# Patient Record
Sex: FEMALE | Race: WHITE
[De-identification: names, ages, dates, MRNs, and addresses within clinical notes are randomized per-mention and may not be internally consistent; named-entity substitution may affect disease eponyms.]

---

## 2017-02-05 ENCOUNTER — HOSPITAL ENCOUNTER (EMERGENCY)
Dept: HOSPITAL 72 - EMR | Age: 62
Discharge: HOME | End: 2017-02-05
Payer: COMMERCIAL

## 2017-02-05 VITALS — DIASTOLIC BLOOD PRESSURE: 63 MMHG | SYSTOLIC BLOOD PRESSURE: 112 MMHG

## 2017-02-05 VITALS — BODY MASS INDEX: 24.84 KG/M2 | WEIGHT: 135 LBS | HEIGHT: 62 IN

## 2017-02-05 VITALS — SYSTOLIC BLOOD PRESSURE: 112 MMHG | DIASTOLIC BLOOD PRESSURE: 63 MMHG

## 2017-02-05 DIAGNOSIS — J11.1: Primary | ICD-10-CM

## 2017-02-05 DIAGNOSIS — E11.9: ICD-10-CM

## 2017-02-05 PROCEDURE — 99284 EMERGENCY DEPT VISIT MOD MDM: CPT

## 2017-02-05 NOTE — EMERGENCY ROOM REPORT
History of Present Illness


General


Chief Complaint:  Upper Respiratory Illness


Source:  Patient





Present Illness


HPI


61-year-old female presents to ED complaining of bodyaches, chills, cough.  

States symptoms started yesterday.  Noting low grade fever.  States cough is 

dry.  Notes generalized bodyaches, 5 out of 10, dull.  No aggravating relieving 

factors.  Denies sick contacts or recent travel.  Did not receive flu 

vaccination this year. denies any other associated symptom


Allergies:  


Coded Allergies:  


     No Known Allergies (Unverified , 6/25/13)





Patient History


Past Medical History:  DM


Past Surgical History:  none


Pertinent Family History:  none


Social History:  Denies: alcohol use, drug use, smoking


Pregnant Now:  No


Immunizations:  UTD


Reviewed Nursing Documentation:  PMH: Agreed, PSxH: Agreed





Nursing Documentation-PMH


Hx Diabetes:  Yes





Review of Systems


All Other Systems:  negative except mentioned in HPI





Physical Exam





Vital Signs








  Date Time  Temp Pulse Resp B/P Pulse Ox O2 Delivery O2 Flow Rate FiO2


 


2/5/17 07:32 100.2 98 18 112/63 99 Room Air  








Sp02 EP Interpretation:  reviewed, normal


General Appearance:  no apparent distress, alert, GCS 15, non-toxic


Head:  normocephalic, atraumatic


Eyes:  bilateral eye PERRL, bilateral eye normal inspection


ENT:  hearing grossly normal, normal pharynx, no angioedema, normal voice


Neck:  full range of motion, supple/symm/no masses


Respiratory:  chest non-tender, lungs clear, normal breath sounds, speaking 

full sentences


Cardiovascular #1:  regular rate, rhythm, no edema


Cardiovascular #2:  2+ carotid (R), 2+ carotid (L), 2+ radial (R), 2+ radial (L)

, 2+ dorsalis pedis (R), 2+ dorsalis pedis (L)


Gastrointestinal:  normal bowel sounds, non tender, soft, non-distended, no 

guarding, no rebound


Rectal:  deferred


Genitourinary:  normal inspection, no CVA tenderness


Musculoskeletal:  back normal, gait/station normal, normal range of motion, non-

tender


Neurologic:  alert, oriented x3, responsive, motor strength/tone normal, 

sensory intact, speech normal


Psychiatric:  judgement/insight normal, memory normal, mood/affect normal, no 

suicidal/homicidal ideation


Reflexes:  3+ bicep (R), 3+ bicep (L), 3+ tricep (R), 3+ tricep (L), 3+ knee (R)

, 3+ knee (L)


Skin:  normal color, no rash, warm/dry, well hydrated


Lymphatic:  no adenopathy





Medical Decision Making


Diagnostic Impression:  


 Primary Impression:  


 Flu-like symptoms


ER Course


Hospital Course 


61-year-old F presents to ED complaining of fever + bodyaches + cough 





Differential diagnoses include: URI, pharyngitis, otitis media, influenza 





Clinical course


Patient placed on stretcher.  After initial history physical exam reveals an 

elderly female in no acute distress.  Bilateral TM unremarkable, no pharyngeal 

erythema.  Lungs clear.  No CVA tenderness.





Given motion in ED.  Clinical findings consistent with influenza.  Given that I 

will treat her with Tamiflu





Diagnosis - influenza-like symptoms 





Stable and discharged home with prescriptions for tamiflu.  drink plenty of 

fluids.  Instructed to followup with PMD.  Return to ED if symptoms recur or 

worsen





Last Vital Signs








  Date Time  Temp Pulse Resp B/P Pulse Ox O2 Delivery O2 Flow Rate FiO2


 


2/5/17 08:20 98.8 96 18 112/63 99 Room Air  








Status:  improved


Disposition:  HOME, SELF-CARE


Condition:  Stable


Scripts


Ibuprofen* (MOTRIN*) 600 Mg Tablet


600 MG ORAL Q8H Y for For Pain, #30 TAB 0 Refills


   Prov: JH TORRES M.D.         2/5/17 


Oseltamivir Phosphate (Tamiflu) 75 Mg Capsule


75 MG ORAL TWICE A DAY for 5 Days, CAP


   Prov: JH TORRES M.D.         2/5/17


Patient Instructions:  Influenza, Adult, Easy-to-Read











JH TORRES M.D. Feb 5, 2017 10:33

## 2017-06-04 ENCOUNTER — HOSPITAL ENCOUNTER (EMERGENCY)
Dept: HOSPITAL 72 - EMR | Age: 62
Discharge: HOME | End: 2017-06-04
Payer: COMMERCIAL

## 2017-06-04 VITALS — HEIGHT: 62 IN | BODY MASS INDEX: 24.84 KG/M2 | WEIGHT: 135 LBS

## 2017-06-04 VITALS — SYSTOLIC BLOOD PRESSURE: 110 MMHG | DIASTOLIC BLOOD PRESSURE: 57 MMHG

## 2017-06-04 VITALS — DIASTOLIC BLOOD PRESSURE: 57 MMHG | SYSTOLIC BLOOD PRESSURE: 110 MMHG

## 2017-06-04 DIAGNOSIS — E11.9: ICD-10-CM

## 2017-06-04 DIAGNOSIS — L03.011: Primary | ICD-10-CM

## 2017-06-04 PROCEDURE — 99284 EMERGENCY DEPT VISIT MOD MDM: CPT

## 2017-06-04 NOTE — EMERGENCY ROOM REPORT
History of Present Illness


General


Chief Complaint:  Skin Rash/Abscess


Source:  Patient





Present Illness


HPI


61 y/o female c/o nail bed infection in right index finger. States she had pain 

for 1 week and started having discharge and swelling today. States pain is 

moderate to severe and is worse with palpation and has no relieving factors. No 

other physical complaints. Denies trauma.  Patient denies any numbness, tingling

, pressure, paralysis, cyanosis, bruising, loss of sensation, or loss of range 

of motion.


Allergies:  


Coded Allergies:  


     No Known Allergies (Unverified , 6/25/13)





Patient History


Past Medical History:  see triage record


Past Surgical History:  none


Pertinent Family History:  none


Pregnant Now:  No


Reviewed Nursing Documentation:  PMH: Agreed, PSxH: Agreed





Nursing Documentation-PMH


Past Medical History:  No History, Except For


Hx Diabetes:  Yes





Review of Systems


All Other Systems:  negative except mentioned in HPI





Physical Exam





Vital Signs








  Date Time  Temp Pulse Resp B/P Pulse Ox O2 Delivery O2 Flow Rate FiO2


 


6/4/17 18:47 99.0 92 16 110/57 97 Room Air  








Sp02 EP Interpretation:  reviewed, normal


General Appearance:  no apparent distress, alert, GCS 15, non-toxic


Head:  normocephalic, atraumatic


Eyes:  bilateral eye normal inspection


ENT:  normal ENT inspection


Neck:  normal inspection


Respiratory:  normal breath sounds, speaking full sentences


Cardiovascular #1:  normal peripheral pulses, normal capillary refill


Musculoskeletal:  back normal, gait/station normal, normal range of motion, non-

tender, calf tenderness


Neurologic:  alert, oriented x3, responsive, motor strength/tone normal, 

sensory intact, speech normal


Skin:  normal color, no rash, warm/dry, well hydrated, other - nailbed of right 

index finger is swollen, indurated, warm and with discharge w/o signs of 

abscess formation


Lymphatic:  no adenopathy





Medical Decision Making


PA Attestation


Dr. Adams is my supervising physician with whom patient management has been 

discussed with.


Diagnostic Impression:  


 Primary Impression:  


 Paronychia


 Qualified Codes:  L03.011 - Cellulitis of right finger


ER Course


Pt. presents to the ED c/o right finger pain


Ddx considered but are not limited to ingrowing nail, trauma, fracture, 

osteomyelitis, paronychia


Vital signs: are WNL, pt. is afebrile 


H&PE are most consistent with paronychia


ORDERS: none required at this time, the diagnosis is clinical 


ED INTERVENTIONS:  Norco 5


DISCHARGE: At this time pt. is stable for d/c to home. Will provide printed 

patient care instructions, and any necessary prescriptions. Care plan and 

follow up instructions have been discussed with the patient prior to discharge.





Last Vital Signs








  Date Time  Temp Pulse Resp B/P Pulse Ox O2 Delivery O2 Flow Rate FiO2


 


6/4/17 19:00 99.0 69 16 110/57 97 Room Air  








Disposition:  HOME, SELF-CARE


Condition:  Stable


Scripts


Trimethoprim/Sulfamethoxazole 160/800* (BACTRIM DS TABLET*) 1 Each Tablet


1 TAB ORAL TWICE A DAY, #14 TAB


   Prov: REY YOON P.A.         6/4/17 


Ibuprofen* (MOTRIN*) 600 Mg Tablet


600 MG ORAL Q6H Y for For Pain, #30 TAB


   Prov: REY YOON P.A.         6/4/17


Patient Instructions:  Paronychia





Additional Instructions:  


Take medication as directed. Patient instructed to take ibuprofen and tylenol 

as needed for pain. Patient to return for wound check in 35 days either here, 

urgent care or with PCP. Advised patient to keep site of infection elevated 

above the level of their heart 3 or 4 times a day, for 30 minutes each time to 

help reduce swelling. Patient is to keep the infected area clean and dry. They 

can take a shower or bath, but be sure to pat the area dry with a towel 

afterward. Patient instructed to not put any antibiotic ointments or creams on 

the area. Patient should come back sooner if their symptoms do not get better 

within 3 days of starting treatment or if the red area gets bigger, more swollen

, or more painful.











REY YOON Jun 4, 2017 19:23

## 2017-09-03 ENCOUNTER — HOSPITAL ENCOUNTER (EMERGENCY)
Dept: HOSPITAL 72 - EMR | Age: 62
Discharge: HOME | End: 2017-09-03
Payer: COMMERCIAL

## 2017-09-03 VITALS — DIASTOLIC BLOOD PRESSURE: 56 MMHG | SYSTOLIC BLOOD PRESSURE: 117 MMHG

## 2017-09-03 VITALS — HEIGHT: 62 IN | WEIGHT: 130 LBS | BODY MASS INDEX: 23.92 KG/M2

## 2017-09-03 VITALS — SYSTOLIC BLOOD PRESSURE: 117 MMHG | DIASTOLIC BLOOD PRESSURE: 56 MMHG

## 2017-09-03 DIAGNOSIS — E11.9: ICD-10-CM

## 2017-09-03 DIAGNOSIS — L03.012: Primary | ICD-10-CM

## 2017-09-03 PROCEDURE — 99284 EMERGENCY DEPT VISIT MOD MDM: CPT

## 2017-09-03 NOTE — EMERGENCY ROOM REPORT
History of Present Illness


General


Chief Complaint:  General Complaint


Source:  Patient





Present Illness


HPI


62-year-old female presents ED complaining of pain and swelling to the left 

index finger x2 days.  Notes some redness and swelling around the nail bed.  

Pain is a 5/10, throbbing, nonradiating.  Denies any fevers or chills.  Denies 

discharge.  No other aggravating or relieving factors.  Denies any other 

associated symptoms


Allergies:  


Coded Allergies:  


     No Known Allergies (Unverified , 6/25/13)





Patient History


Past Medical History:  DM


Past Surgical History:  none


Pertinent Family History:  none


Social History:  Denies: smoking, alcohol use, drug use


Pregnant Now:  No


Immunizations:  UTD


Reviewed Nursing Documentation:  PMH: Agreed, PSxH: Agreed





Nursing Documentation-PMH


Hx Diabetes:  Yes





Review of Systems


All Other Systems:  negative except mentioned in HPI





Physical Exam





Vital Signs








  Date Time  Temp Pulse Resp B/P (MAP) Pulse Ox O2 Delivery O2 Flow Rate FiO2


 


9/3/17 08:43 98.2 92 16 117/56 98 Room Air  








Sp02 EP Interpretation:  reviewed, normal


General Appearance:  no apparent distress, alert, GCS 15, non-toxic


Head:  normocephalic


Eyes:  bilateral eye normal inspection, bilateral eye PERRL


ENT:  normal ENT inspection


Neck:  normal inspection


Respiratory:  normal inspection


Cardiovascular #1:  normal inspection


Gastrointestinal:  normal inspection


Rectal:  deferred


Genitourinary:  no CVA tenderness


Musculoskeletal:  swelling - erythema/swelling around nailbed on L index 

finger. no fluctuance or discharge


Neurologic:  alert, oriented x3, responsive, motor strength/tone normal, 

sensory intact, speech normal


Psychiatric:  normal inspection


Skin:  normal inspection


Lymphatic:  normal inspection





Medical Decision Making


Diagnostic Impression:  


 Primary Impression:  


 Paronychia


ER Course


Hospital Course 


62-year-old F presents to ED s/p swelling L index finger. no trauma





Clinical course


Patient placed on stretcher.  After initial history is full exam reveals a male 

in no acute distress.  There is some swelling and fluctuance around the L index

  finger nailbed.  Consistent with paronychia





I explained to the patient that this requires drainage and antibiotics.  

Patient refuses drainage.  Will agree to warm compresses and antibiotics





Explained to the patient that this may not effectively treat her infection and 

she may need to return for drainage.  Patient understands





Diagnosis - paronychia 





Stable and discharged to home with prescription for Clindamycin.  Warm 

compresses.  Followup with PMD.  Return to ED if any signs of infection develop





Last Vital Signs








  Date Time  Temp Pulse Resp B/P (MAP) Pulse Ox O2 Delivery O2 Flow Rate FiO2


 


9/3/17 09:22 98.2  16 117/56 98 Room Air  


 


9/3/17 08:43  92      








Status:  improved


Disposition:  HOME, SELF-CARE


Condition:  Stable


Scripts


Clindamycin Hcl (CLINDAMYCIN HCL) 300 Mg Capsule


300 MG ORAL THREE TIMES A DAY, #21 CAP


   Prov: JH TORRES M.D.         9/3/17 


Acetaminophen* (TYLENOL EXTRA STRENGTH*) 500 Mg Tablet


500 MG ORAL Q8H Y for Prn Headache/Temp > 101, #30 TAB 0 Refills


   Prov: JH TORRES M.D.         9/3/17


Referrals:  


[facility] (PCP)


Patient Instructions:  Paronychia, Easy-to-Read





Additional Instructions:  


take abx. warm compresses. if symptoms do not improve return for incision and 

drainage











JH TORRES M.D. Sep 3, 2017 11:52

## 2017-12-28 ENCOUNTER — HOSPITAL ENCOUNTER (EMERGENCY)
Dept: HOSPITAL 72 - EMR | Age: 62
Discharge: HOME | End: 2017-12-28
Payer: COMMERCIAL

## 2017-12-28 VITALS — BODY MASS INDEX: 23 KG/M2 | HEIGHT: 62 IN | WEIGHT: 125 LBS

## 2017-12-28 VITALS — DIASTOLIC BLOOD PRESSURE: 60 MMHG | SYSTOLIC BLOOD PRESSURE: 128 MMHG

## 2017-12-28 DIAGNOSIS — J02.9: Primary | ICD-10-CM

## 2017-12-28 DIAGNOSIS — E11.9: ICD-10-CM

## 2017-12-28 PROCEDURE — 99283 EMERGENCY DEPT VISIT LOW MDM: CPT

## 2017-12-28 NOTE — EMERGENCY ROOM REPORT
History of Present Illness


General


Chief Complaint:  Sore Throat


Source:  Patient, Medical Record





Present Illness


HPI


Patient presents with throat pain for > 1 week.  Feverish.  Aches. Slightly 

productive cough.  No NVD, chest pain, rashes, dysuria, joint pain.





Patient is diabetic and glucose controlled.


Allergies:  


Coded Allergies:  


     No Known Allergies (Unverified , 6/25/13)





Patient History


Past Medical History:  see triage record


Social History:  Denies: smoking


Social History Narrative


at home


Last Menstrual Period:  3 years ago


Reviewed Nursing Documentation:  PMH: Agreed, PSxH: Agreed





Nursing Documentation-PMH


Past Medical History:  No History, Except For


Hx Diabetes:  Yes





Review of Systems


All Other Systems:  negative except mentioned in HPI





Physical Exam





Vital Signs








  Date Time  Temp Pulse Resp B/P (MAP) Pulse Ox O2 Delivery O2 Flow Rate FiO2


 


12/28/17 10:15 97.9 81 18 125/59 98 Room Air  








Sp02 EP Interpretation:  reviewed, normal


General Appearance:  well appearing, no apparent distress


Head:  normocephalic, atraumatic


Eyes:  bilateral eye normal inspection, bilateral eye PERRL


ENT:  moist mucus membranes, pharyngeal erythema


Neck:  full range of motion, supple


Respiratory:  lungs clear, normal breath sounds, no respiratory distress, 

speaking full sentences


Cardiovascular #1:  regular rate, rhythm


Gastrointestinal:  normal inspection


Musculoskeletal:  digits/nails normal, gait/station normal, normal range of 

motion


Neurologic:  alert, normal gait, grossly normal


Psychiatric:  mood/affect normal


Skin:  no rash





Medical Decision Making


Diagnostic Impression:  


 Primary Impression:  


 Pharyngitis


 Qualified Codes:  J02.9 - Acute pharyngitis, unspecified


ER Course


Patient presents with sore throat  DDx: strep, viral, influenza amongst others.

  Not toxic.  Course longer than expected for influenza.  Will treat with 

antibiotics.  





Patient stable for outpatient observation and treatment.





Last Vital Signs








  Date Time  Temp Pulse Resp B/P (MAP) Pulse Ox O2 Delivery O2 Flow Rate FiO2


 


12/28/17 11:04 97.9 78 18 128/60 98 Room Air  








Status:  unchanged


Disposition:  HOME, SELF-CARE


Condition:  Stable


Scripts


Chlorpheniramine Maleate (CHLOR-TRIMETON) 4 Mg Tablet


4 MG PO Q6HR Y for congestion, #10 TAB


   Prov: Seb Adams M.D.         12/28/17 


Azithromycin* (ZITHROMAX*) 250 Mg Tablet


250 MG ORAL DAILY, #6 TAB 0 Refills


   Take two tables once daily for 1 day, then one tablet once daily for 4


   days.


   Prov: Seb Adams M.D.         12/28/17











Seb Adams M.D. Dec 28, 2017 10:56

## 2018-02-27 ENCOUNTER — HOSPITAL ENCOUNTER (EMERGENCY)
Dept: HOSPITAL 72 - EMR | Age: 63
Discharge: HOME | End: 2018-02-27
Payer: SELF-PAY

## 2018-02-27 VITALS — SYSTOLIC BLOOD PRESSURE: 120 MMHG | DIASTOLIC BLOOD PRESSURE: 73 MMHG

## 2018-02-27 VITALS — SYSTOLIC BLOOD PRESSURE: 116 MMHG | DIASTOLIC BLOOD PRESSURE: 73 MMHG

## 2018-02-27 VITALS — BODY MASS INDEX: 23.92 KG/M2 | HEIGHT: 62 IN | WEIGHT: 130 LBS

## 2018-02-27 DIAGNOSIS — E11.9: ICD-10-CM

## 2018-02-27 DIAGNOSIS — J06.9: Primary | ICD-10-CM

## 2018-02-27 PROCEDURE — 99284 EMERGENCY DEPT VISIT MOD MDM: CPT

## 2018-02-27 PROCEDURE — 71045 X-RAY EXAM CHEST 1 VIEW: CPT

## 2018-02-27 NOTE — EMERGENCY ROOM REPORT
History of Present Illness


General


Chief Complaint:  Upper Respiratory Illness


Source:  Patient





Present Illness


HPI


62-year-old female presents with cough, runny nose for 5 days. Cough is 

productive with green phlegm. Pt still eating/drinking well. +sick contacts. No 

recent travel. No SOB, cp, abdominal pain, n/v/d.


Allergies:  


Coded Allergies:  


     No Known Allergies (Unverified , 6/25/13)





Patient History


Past Medical History:  see triage record


Past Surgical History:  none


Pertinent Family History:  none


Last Menstrual Period:  Post


Reviewed Nursing Documentation:  PMH: Agreed, PSxH: Agreed





Nursing Documentation-PMH


Past Medical History:  No History, Except For


Hx Diabetes:  Yes





Review of Systems


All Other Systems:  negative except mentioned in HPI





Physical Exam





Vital Signs








  Date Time  Temp Pulse Resp B/P (MAP) Pulse Ox O2 Delivery O2 Flow Rate FiO2


 


2/27/18 11:09 98.9 88 17 116/73 98 Room Air  





 99.0       








Sp02 EP Interpretation:  reviewed, normal


General Appearance:  normal inspection, well appearing, no apparent distress, 

alert, GCS 15, non-toxic


Head:  normocephalic, atraumatic


Eyes:  bilateral eye normal inspection, bilateral eye PERRL, bilateral eye EOMI


ENT:  normal ENT inspection, normal pharynx, normal voice, moist mucus membranes


Neck:  normal inspection, full range of motion, supple


Respiratory:  normal inspection, lungs clear, normal breath sounds, no 

respiratory distress, no retraction, no wheezing, speaking full sentences, 

chest symmetrical


Cardiovascular #1:  normal inspection, regular rate, rhythm, normal capillary 

refill


Cardiovascular #2:  2+ radial (R), 2+ radial (L)


Gastrointestinal:  normal inspection, non tender, soft, non-distended, no 

guarding


Musculoskeletal:  normal inspection, back normal, normal range of motion, non-

tender


Neurologic:  normal inspection, alert, oriented x3, responsive, motor strength/

tone normal, sensory intact, normal gait, speech normal


Psychiatric:  normal inspection, judgement/insight normal, memory normal


Skin:  normal inspection, normal color, no rash, warm/dry, well hydrated, 

normal turgor





Medical Decision Making


Diagnostic Impression:  


 Primary Impression:  


 Upper respiratory infection


ER Course


62-year-old female p/w runny nose, cough 


Appears non- toxic, well hydrated, tolerating PO





DDX:


Viral URI / pneumonia 





Plan:


Chest x-ray





ER course:


Pt stable in ED, remains nontoxic appearing, no sob. Tolerating PO





Disposition:


Patient discharged to home with Tessalon Perles and nasal spray


Patient instructed to follow up with PMD in 1 week. Also instructed to take 

motrin/tylenol at home.


Very strict return precautions discussed with patient such as intractable fever 

and chills, unable to eat or drink, severe chest pain or shortness of breath. 

Patient verbalized understanding and agrees with plan. 





Please note that this Emergency Department Report was dictated using [employer] technology software, occasionally this can lead to 

erroneous entry secondary to interpretation by the dictation equipment


Chest X-ray


CXR: Ordered: Yes


1 view


Indication: Cough


EP interpretation: Yes


Interpretation: No consolidation, no effusion, no PTX, no acute cardiopulmonary 

disease


Impression: No acute disease





Electronically signed by Franky Herring MD





Last Vital Signs








  Date Time  Temp Pulse Resp B/P (MAP) Pulse Ox O2 Delivery O2 Flow Rate FiO2


 


2/27/18 11:35 99.0  17 116/73 98 Room Air  





 99.0       


 


2/27/18 11:35  88      








Disposition:  HOME, SELF-CARE


Condition:  Improved


Scripts


Ibuprofen* (MOTRIN*) 600 Mg Tablet


600 MG ORAL Q8H Y for For Pain, #30 TAB 0 Refills


   Prov: Franky Herring M.D.         2/27/18 


Benzonatate* (TESSALON PERLE*) 100 Mg Capsule


100 MG ORAL THREE TIMES A DAY, #21 PERLE


   Prov: Franky Herring M.D.         2/27/18


Referrals:  


NON PHYSICIAN (PCP)


Patient Instructions:  Upper Respiratory Infection, Adult











Franky Herring M.D. Feb 27, 2018 11:50

## 2018-07-08 ENCOUNTER — HOSPITAL ENCOUNTER (EMERGENCY)
Dept: HOSPITAL 72 - EMR | Age: 63
Discharge: HOME | End: 2018-07-08
Payer: COMMERCIAL

## 2018-07-08 VITALS — BODY MASS INDEX: 23.92 KG/M2 | HEIGHT: 62 IN | WEIGHT: 130 LBS

## 2018-07-08 VITALS — SYSTOLIC BLOOD PRESSURE: 123 MMHG | DIASTOLIC BLOOD PRESSURE: 68 MMHG

## 2018-07-08 DIAGNOSIS — Z79.84: ICD-10-CM

## 2018-07-08 DIAGNOSIS — L03.012: ICD-10-CM

## 2018-07-08 DIAGNOSIS — H66.91: Primary | ICD-10-CM

## 2018-07-08 DIAGNOSIS — E11.9: ICD-10-CM

## 2018-07-08 DIAGNOSIS — J02.9: ICD-10-CM

## 2018-07-08 PROCEDURE — 99284 EMERGENCY DEPT VISIT MOD MDM: CPT

## 2018-07-08 NOTE — EMERGENCY ROOM REPORT
History of Present Illness


General


Chief Complaint:  Earache


Source:  Patient





Present Illness


HPI


Patient presents with 2 days of ear pain and sore throat.  She rates the pain 8-

9/10 and constant and worse when she swallows.  It's sharp and burning.  Is 

also pressure in the ear.  She took ibuprofen last night and this helped 

somewhat.  She denies any high fevers or chills.  There's no nausea vomiting or 

diarrhea.  Her blood sugars have been in the 150-160 range and she takes 

metformin without insulin.





She complains about left index finger pain.  She's had a diagnosis of 

paronychia him in the past.  There is no drainage at this time.  Patient states 

there are changes in the nail.





No chest pain, shortness of breath, wheezing, cough, nausea, vomiting, diarrhea 

or dysuria.  There are no skin rashes.  There's no headache.


Allergies:  


Coded Allergies:  


     No Known Allergies (Unverified , 6/25/13)





Patient History


Past Medical History:  see triage record


Social History:  Denies: smoking, alcohol use, drug use


Social History Narrative


at home


Last Menstrual Period:  NA


Reviewed Nursing Documentation:  PMH: Agreed; PSxH: Agreed





Nursing Documentation-PMH


Past Medical History:  No History, Except For


Hx Diabetes:  Yes





Review of Systems


All Other Systems:  negative except mentioned in HPI





Physical Exam





Vital Signs








  Date Time  Temp Pulse Resp B/P (MAP) Pulse Ox O2 Delivery O2 Flow Rate FiO2


 


7/8/18 07:16 98.9 84 18 123/68 94 Room Air  





 99.0       








Sp02 EP Interpretation:  reviewed, normal


General Appearance:  well appearing, no apparent distress


Head:  normocephalic, atraumatic


Eyes:  bilateral eye normal inspection, bilateral eye PERRL


ENT:  hearing grossly normal, normal voice, pharyngeal erythema, other - TM red 

and buldge, no pinna tenderness - R -- L normal


Neck:  full range of motion, supple


Respiratory:  chest non-tender, lungs clear, normal breath sounds, no 

respiratory distress, speaking full sentences


Cardiovascular #1:  regular rate, rhythm


Cardiovascular #2:  2+ radial (R)


Gastrointestinal:  normal inspection, non tender


Musculoskeletal:  back normal, gait/station normal, normal range of motion


Neurologic:  alert, oriented x3, normal gait, grossly normal


Psychiatric:  mood/affect normal


Skin:  no rash





Medical Decision Making


Diagnostic Impression:  


 Primary Impression:  


 Otitis media


 Qualified Codes:  H66.004 - Acute suppurative otitis media without spontaneous 

rupture of ear drum, recurrent, right ear


 Additional Impressions:  


 Pharyngitis


 Qualified Codes:  J02.9 - Acute pharyngitis, unspecified


 Paronychia


 Diabetes


 Qualified Codes:  E11.9 - Type 2 diabetes mellitus without complications


ER Course


Patient presents with ear pain and throat pain.  Physical exam is consistent 

with otitis media however other considerations are otitis externa.  There are 

no exudates however the throat is quite red and antibiotics are indicated for 

this ear infection.  She'll be given the first dose now.  Her blood sugars are 

fairly well-controlled at home by history.





In addition she has evidence of a paronychia him.  There is no evidence that 

this needs to be drained.  The other possibility is onycholysis and she'll be 

given 2 types of creams to use for the finger.





The patient is stable for outpatient observation and treatment.





Last Vital Signs








  Date Time  Temp Pulse Resp B/P (MAP) Pulse Ox O2 Delivery O2 Flow Rate FiO2


 


7/8/18 07:49 98.9  18 123/68 94 Room Air  





 210.0       


 


7/8/18 07:16  84      








Status:  improved


Disposition:  HOME, SELF-CARE


Condition:  Improved


Scripts


Bacitracin (Bacitracin) 28.4 Gm Oint...g.


1 APPLIC TOPIC BID, #20 GM


   Prov: Seb Adams M.D.         7/8/18 


Terbinafine (Lamisil At) 12 Gm Cream..g.


1 APPL TOPIC BID, #20 GM


   apply to index finger with antibiotic ointment


   Prov: Seb Adams M.D.         7/8/18 


Azithromycin* (ZITHROMAX*) 250 Mg Tablet


250 MG ORAL DAILY, #4 TAB


   Prov: Seb Adams M.D.         7/8/18 


Tramadol Hcl* (ULTRAM*) 50 Mg Tablet


50 MG ORAL Q6H PRN for For Pain, #10 TAB 0 Refills


   Prov: Seb Adams M.D.         7/8/18


Referrals:  


[facility] (PCP)











Seb Adams M.D. Jul 8, 2018 07:37

## 2018-07-23 ENCOUNTER — HOSPITAL ENCOUNTER (EMERGENCY)
Dept: HOSPITAL 72 - EMR | Age: 63
Discharge: HOME | End: 2018-07-23
Payer: COMMERCIAL

## 2018-07-23 VITALS — SYSTOLIC BLOOD PRESSURE: 121 MMHG | DIASTOLIC BLOOD PRESSURE: 67 MMHG

## 2018-07-23 VITALS — HEIGHT: 62 IN | WEIGHT: 134 LBS | BODY MASS INDEX: 24.66 KG/M2
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
## 2018-07-23 DIAGNOSIS — S80.861A: ICD-10-CM

## 2018-07-23 DIAGNOSIS — S80.862A: Primary | ICD-10-CM
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
## 2018-07-23 DIAGNOSIS — E11.9: ICD-10-CM

## 2018-07-23 DIAGNOSIS — R21: ICD-10-CM

## 2018-07-23 PROCEDURE — 99283 EMERGENCY DEPT VISIT LOW MDM: CPT

## 2018-07-23 NOTE — EMERGENCY ROOM REPORT
History of Present Illness


General


Chief Complaint:  Skin Rash/Abscess


Source:  Patient, Medical Record





Present Illness


HPI


63-year-old female presents to the emergency department complaining of itchy 

rash on the bilateral lower extremities from the knee down 5 days.  Patient 

states that her  also has similar symptoms.  She denies pain. Patient 

denies fevers or chills. Pt. denies fevers, chills or swollen tender lymph 

nodes. Denies lesions/rashes elsewhere on the body. Denies new medications or 

body washes or creams. Denies swelling of the lips, tongue , throat or airway. 

Denies wheezing, or shortness of breath.  Denies recent travel, recent illness 

or ill contacts.  denies blisters, oral lesions, or sloughing of the skin


Allergies:  


Coded Allergies:  


     No Known Allergies (Unverified , 6/25/13)





Patient History


Past Medical History:  see triage record


Past Surgical History:  none


Pertinent Family History:  none


Last Menstrual Period:  3 yrs ago


Reviewed Nursing Documentation:  PMH: Agreed; PSxH: Agreed





Nursing Documentation-PMH


Past Medical History:  No History, Except For


Hx Diabetes:  Yes





Review of Systems


All Other Systems:  negative except mentioned in HPI





Physical Exam





Vital Signs








  Date Time  Temp Pulse Resp B/P (MAP) Pulse Ox O2 Delivery O2 Flow Rate FiO2


 


7/23/18 12:15 98.1 89 18 121/67 97 Room Air  





 98.1       








Sp02 EP Interpretation:  reviewed, normal


General Appearance:  no apparent distress, alert, GCS 15, non-toxic


Head:  normocephalic, atraumatic


Eyes:  bilateral eye normal inspection, bilateral eye PERRL


ENT:  hearing grossly normal, normal voice, other - no swelling of the lips or 

the tongue


Neck:  full range of motion


Respiratory:  lungs clear, normal breath sounds, speaking full sentences


Cardiovascular #1:  regular rate, rhythm, no edema, normal capillary refill


Musculoskeletal:  back normal, gait/station normal, normal range of motion, non-

tender


Neurologic:  alert, oriented x3, responsive, motor strength/tone normal, 

sensory intact, speech normal, grossly normal


Psychiatric:  judgement/insight normal


Skin:  normal color, warm/dry, well hydrated, rash - -multiple indurated papules

, in a scattered distribution with wheel reaction surrounding, no increased 

temperature to palpation, no crusting, no blisters or vesicles


Lymphatic:  no adenopathy





Medical Decision Making


PA Attestation


Dr. Rendon is my supervising Physician whom patient management has been 

discussed with.


Diagnostic Impression:  


 Primary Impression:  


 Insect bites


 Qualified Codes:  W57.XXXA - Bitten or stung by nonvenomous insect and other 

nonvenomous arthropods, initial encounter


ER Course


63-year-old female presents to the emergency department complaining of itchy 

rash on the bilateral lower extremities from the knee down 5 days.  Patient 

states that her  also has similar symptoms.  She denies pain. Patient 

denies fevers or chills. Pt. denies fevers, chills or swollen tender lymph 

nodes. Denies lesions/rashes elsewhere on the body. Denies new medications or 

body washes or creams. Denies swelling of the lips, tongue , throat or airway. 

Denies wheezing, or shortness of breath.  Denies recent travel, recent illness 

or ill contacts.  denies blisters, oral lesions, or sloughing of the skin








Ddx considered but are not limited to cellulitis, scabies, insect bites, tic 

bites, spider bites, contact dermatitis, Drug reaction, allergic reaction, 

fungal infection, lice. 





Vital signs: are WNL, pt. is afebrile





H&PE are most consistent with Multiple insect bites of the lower extremity no 

evidence of infection no evidence of impending airway compromise or anaphylaxis.


-multiple indurated papules, in a scattered distribution with wheel reaction 

surrounding, no increased temperature to palpation, no crusting, no blisters or 

vesicles





ORDERS: none required at this time, the diagnosis is clinical





ED INTERVENTIONS: None required at this time. 





DISCHARGE: At this time pt. is stable for d/c to home. Will provide printed 

patient care instructions, and any necessary prescriptions. Care plan and 

follow up instructions have been discussed with the patient prior to discharge.





Last Vital Signs








  Date Time  Temp Pulse Resp B/P (MAP) Pulse Ox O2 Delivery O2 Flow Rate FiO2


 


7/23/18 12:15 98.1 89 18 121/67 97 Room Air  





 98.1       








Disposition:  HOME, SELF-CARE


Condition:  Stable


Referrals:  


[facility] (PCP)


Patient Instructions:  Insect Bite





Additional Instructions:  


Take medications as directed. 


 ** Follow up with a Primary Care Provider in 3-5 days, even if your symptoms 

have resolved. ** 


--Please review list of primary care clinics, if you do not already have a 

primary care provider





Return sooner to ED if new symptoms occur, or current symptoms become worse. 


Do not drink alcohol, drive, or operate heavy machinery while taking 

Hydroxyzine as this may cause drowsiness. 











- Please note that this Emergency Department Report was dictated using 1[employer] technology software, occasionally this can lead to 

erroneous entry secondary to interpretation by the dictation equipment.











Lissett Leal Jul 23, 2018 12:53

## 2018-10-21 ENCOUNTER — HOSPITAL ENCOUNTER (EMERGENCY)
Dept: HOSPITAL 72 - EMR | Age: 63
Discharge: HOME | End: 2018-10-21
Payer: COMMERCIAL

## 2018-10-21 VITALS — HEIGHT: 62 IN | WEIGHT: 130 LBS | BODY MASS INDEX: 23.92 KG/M2

## 2018-10-21 VITALS — DIASTOLIC BLOOD PRESSURE: 77 MMHG | SYSTOLIC BLOOD PRESSURE: 126 MMHG

## 2018-10-21 VITALS — SYSTOLIC BLOOD PRESSURE: 126 MMHG | DIASTOLIC BLOOD PRESSURE: 77 MMHG

## 2018-10-21 DIAGNOSIS — L60.1: ICD-10-CM

## 2018-10-21 DIAGNOSIS — M17.12: ICD-10-CM

## 2018-10-21 DIAGNOSIS — J06.9: Primary | ICD-10-CM

## 2018-10-21 DIAGNOSIS — E11.9: ICD-10-CM

## 2018-10-21 PROCEDURE — 99283 EMERGENCY DEPT VISIT LOW MDM: CPT

## 2018-10-21 NOTE — EMERGENCY ROOM REPORT
History of Present Illness


General


Chief Complaint:  Upper Respiratory Illness


Source:  Patient





Present Illness


HPI


Patient returns with recurrent symptoms of bronchitis.  She has nasal 

congestion and a cough.  She denies any fevers or chills at this time.  She is 

requesting antibiotics.  In addition to that she's requesting cough medicine.  

She states that she was using nasal sprays last night but they have not been 

effective.





She also is complaining about left knee pain.  She states she has cartilage 

injury.  Her doctors told her that antibiotics and also nonsteroidal anti-

inflammatories we will destroy the cartilage in her leg.





No nausea vomiting diarrhea chest pain headache rashes.





She has a healing paronychia of the index finger.  She claims that there is a 

fungus there and is requesting a cream for that.


Allergies:  


Coded Allergies:  


     No Known Allergies (Unverified , 6/25/13)





Patient History


Past Medical History:  see triage record


Social History:  Denies: smoking


Social History Narrative


at home


Last Menstrual Period:  na


Reviewed Nursing Documentation:  PMH: Agreed; PSxH: Agreed





Nursing Documentation-PMH


Past Medical History:  No History, Except For


Hx Diabetes:  Yes





Review of Systems


All Other Systems:  negative except mentioned in HPI





Physical Exam





Vital Signs








  Date Time  Temp Pulse Resp B/P (MAP) Pulse Ox O2 Delivery O2 Flow Rate FiO2


 


10/21/18 07:55 99.2 97 20 127/81 95 Room Air  





 99.1       








Sp02 EP Interpretation:  reviewed, normal


General Appearance:  well appearing, no apparent distress


Head:  normocephalic, atraumatic


Eyes:  bilateral eye normal inspection, bilateral eye PERRL


ENT:  hearing grossly normal, normal voice, moist mucus membranes, pharyngeal 

erythema, other - Nasal congestion


Neck:  full range of motion, supple


Respiratory:  no respiratory distress, speaking full sentences


Cardiovascular #1:  regular rate, rhythm


Musculoskeletal:  no calf tenderness, other - Wearing an ace


Neurologic:  alert, normal gait, grossly normal


Psychiatric:  mood/affect normal


Skin:  no rash, other - healing paronychia





Medical Decision Making


Diagnostic Impression:  


 Primary Impression:  


 Upper respiratory infection


 Qualified Codes:  J06.9 - Acute upper respiratory infection, unspecified


 Additional Impressions:  


 Osteoarthritis


 Qualified Codes:  M17.12 - Unilateral primary osteoarthritis, left knee


 Onycholysis


ER Course


Patient with URI.  DDX: allergic, viral, bacterial.  Symptomatic treatment 

indicated.  Will also prescribe nasonex.





Also has resolving paronychia.  Concern over possible fungus.  Will Rx for this.





Discussed osteoarthritis.





Patient stable for outpatient observation and treatment.


Status:  unchanged


Disposition:  HOME, SELF-CARE


Condition:  Improved


Scripts


Tolnaftate (Tolnaftate) 15 Gm Cream..g.


1 APPLIC TOPIC BID, #30 APPLIC


   Prov: Seb Adams M.D.         10/21/18 


Chlorpheniramine Maleate (CHLOR-TRIMETON) 4 Mg Tablet


4 MG PO Q6HR PRN for congestion, #10 TAB


   Prov: Seb Adams M.D.         10/21/18 


Mometasone Furoate (NASONEX) 17 Gm Spray.pump


2 SPRAYS NASAL DAILY, #1 GM 0 Refills


   Prov: Seb Adams M.D.         10/21/18 


Dextromethorphan Hb/Doxylamine (ROBITUSSIN NIGHTTIME COUGH DM) 237 Ml Liquid


5 ML PO Q6HR PRN for For Cough, #90 ML


   Prov: Seb Adams M.D.         10/21/18











Seb Adams M.D. Oct 21, 2018 08:08

## 2018-12-19 ENCOUNTER — HOSPITAL ENCOUNTER (EMERGENCY)
Dept: HOSPITAL 72 - EMR | Age: 63
Discharge: HOME | End: 2018-12-19
Payer: COMMERCIAL

## 2018-12-19 VITALS — HEIGHT: 62 IN | WEIGHT: 135 LBS | BODY MASS INDEX: 24.84 KG/M2

## 2018-12-19 VITALS — DIASTOLIC BLOOD PRESSURE: 62 MMHG | SYSTOLIC BLOOD PRESSURE: 123 MMHG

## 2018-12-19 DIAGNOSIS — L03.031: Primary | ICD-10-CM

## 2018-12-19 DIAGNOSIS — E11.9: ICD-10-CM

## 2018-12-19 PROCEDURE — 99282 EMERGENCY DEPT VISIT SF MDM: CPT

## 2018-12-19 NOTE — EMERGENCY ROOM REPORT
History of Present Illness


General


Chief Complaint:  General Complaint


Source:  Patient





Present Illness


HPI


This patient c/o pain right second toe about a day. No fever, no trauma. Can 

walk without difficulty. No similar history. Although she did have similar once 

on finger/nail and she is requesting antibiotics for today's problem.


Allergies:  


Coded Allergies:  


     No Known Allergies (Unverified , 6/25/13)





Patient History


Pregnant Now:  No





Nursing Documentation-[facility]


Past Medical History:  No History, Except For


Hx Diabetes:  Yes





Review of Systems


All Other Systems:  limited





Physical Exam





Vital Signs








  Date Time  Temp Pulse Resp B/P (MAP) Pulse Ox O2 Delivery O2 Flow Rate FiO2


 


12/19/18 07:36 97.9 82 20 126/64 97 Room Air  








General Appearance:  well appearing, no apparent distress


Head:  normocephalic, atraumatic


ENT:  hearing grossly normal, normal voice


Neck:  full range of motion, supple


Respiratory:  no respiratory distress, speaking full sentences


Musculoskeletal:  other - right second toe there is a small paronychia lateral 

half of toe at nail


Neurologic:  alert, normal gait


Psychiatric:  mood/affect normal


Skin:  no rash





Medical Decision Making


Diagnostic Impression:  


 Primary Impression:  


 Paronychia


ER Course


when I came back to room with scalpel and explained to patient that she needs a 

one second duration, tiny incision, patient refused


she wants antibiotics


antibiotics are not indicated, i offered again, she again refused and therefore 

she was d/c


recommended to patient that she soak foot in warm water





Last Vital Signs








  Date Time  Temp Pulse Resp B/P (MAP) Pulse Ox O2 Delivery O2 Flow Rate FiO2


 


12/19/18 07:36 97.9 82 20 126/64 97 Room Air  








Status:  unchanged


Disposition:  HOME, SELF-CARE


Condition:  Stable


Patient Instructions:  [facility] Easy-toTreverRead











Jose Lazar M.D. Dec 19, 2018 07:50